# Patient Record
Sex: MALE | Race: WHITE | NOT HISPANIC OR LATINO | URBAN - METROPOLITAN AREA
[De-identification: names, ages, dates, MRNs, and addresses within clinical notes are randomized per-mention and may not be internally consistent; named-entity substitution may affect disease eponyms.]

---

## 2021-06-28 ENCOUNTER — TRANSCRIBE ORDERS (OUTPATIENT)
Dept: ADMINISTRATIVE | Facility: HOSPITAL | Age: 51
End: 2021-06-28

## 2021-06-28 DIAGNOSIS — R07.9 CHEST PAIN, UNSPECIFIED TYPE: Primary | ICD-10-CM

## 2021-07-06 ENCOUNTER — HOSPITAL ENCOUNTER (OUTPATIENT)
Dept: CARDIOLOGY | Facility: HOSPITAL | Age: 51
Discharge: HOME | End: 2021-07-06
Admitting: NURSE PRACTITIONER

## 2021-07-06 DIAGNOSIS — R07.9 CHEST PAIN, UNSPECIFIED TYPE: ICD-10-CM

## 2021-07-06 LAB
BH CV STRESS BP STAGE 1: NORMAL
BH CV STRESS BP STAGE 2: NORMAL
BH CV STRESS BP STAGE 3: NORMAL
BH CV STRESS BP STAGE 4: NORMAL
BH CV STRESS DURATION MIN STAGE 1: 3
BH CV STRESS DURATION MIN STAGE 2: 3
BH CV STRESS DURATION MIN STAGE 3: 3
BH CV STRESS DURATION MIN STAGE 4: 2
BH CV STRESS DURATION SEC STAGE 1: 0
BH CV STRESS DURATION SEC STAGE 2: 0
BH CV STRESS DURATION SEC STAGE 3: 0
BH CV STRESS DURATION SEC STAGE 4: 59
BH CV STRESS GRADE STAGE 1: 10
BH CV STRESS GRADE STAGE 2: 12
BH CV STRESS GRADE STAGE 3: 14
BH CV STRESS GRADE STAGE 4: 16
BH CV STRESS HR STAGE 1: 83
BH CV STRESS HR STAGE 2: 104
BH CV STRESS HR STAGE 3: 122
BH CV STRESS HR STAGE 4: 154
BH CV STRESS METS STAGE 1: 5
BH CV STRESS METS STAGE 2: 7.5
BH CV STRESS METS STAGE 3: 10
BH CV STRESS METS STAGE 4: 13.5
BH CV STRESS PROTOCOL 1: NORMAL
BH CV STRESS RECOVERY BP: NORMAL MMHG
BH CV STRESS RECOVERY HR: 96 BPM
BH CV STRESS SPEED STAGE 1: 1.7
BH CV STRESS SPEED STAGE 2: 2.5
BH CV STRESS SPEED STAGE 3: 3.4
BH CV STRESS SPEED STAGE 4: 4.2
BH CV STRESS STAGE 1: 1
BH CV STRESS STAGE 2: 2
BH CV STRESS STAGE 3: 3
BH CV STRESS STAGE 4: 4
MAXIMAL PREDICTED HEART RATE: 170 BPM
PERCENT MAX PREDICTED HR: 91.76 %
STRESS BASELINE BP: NORMAL MMHG
STRESS BASELINE HR: 71 BPM
STRESS O2 SAT REST: 98 %
STRESS PERCENT HR: 108 %
STRESS POST ESTIMATED WORKLOAD: 13.5 METS
STRESS POST EXERCISE DUR MIN: 11 MIN
STRESS POST EXERCISE DUR SEC: 59 SEC
STRESS POST O2 SAT PEAK: 98 %
STRESS POST PEAK BP: NORMAL MMHG
STRESS POST PEAK HR: 156 BPM
STRESS TARGET HR: 145 BPM

## 2021-07-06 PROCEDURE — 93016 CV STRESS TEST SUPVJ ONLY: CPT | Performed by: INTERNAL MEDICINE

## 2021-07-06 PROCEDURE — 93018 CV STRESS TEST I&R ONLY: CPT | Performed by: INTERNAL MEDICINE

## 2021-07-06 PROCEDURE — 93017 CV STRESS TEST TRACING ONLY: CPT

## 2021-11-22 ENCOUNTER — APPOINTMENT (OUTPATIENT)
Dept: MRI IMAGING | Facility: HOSPITAL | Age: 51
End: 2021-11-22

## 2021-11-22 ENCOUNTER — HOSPITAL ENCOUNTER (EMERGENCY)
Facility: HOSPITAL | Age: 51
Discharge: HOME | End: 2021-11-22
Attending: EMERGENCY MEDICINE | Admitting: EMERGENCY MEDICINE

## 2021-11-22 ENCOUNTER — APPOINTMENT (OUTPATIENT)
Dept: CT IMAGING | Facility: HOSPITAL | Age: 51
End: 2021-11-22

## 2021-11-22 ENCOUNTER — APPOINTMENT (OUTPATIENT)
Dept: GENERAL RADIOLOGY | Facility: HOSPITAL | Age: 51
End: 2021-11-22

## 2021-11-22 VITALS
RESPIRATION RATE: 16 BRPM | DIASTOLIC BLOOD PRESSURE: 94 MMHG | HEIGHT: 74 IN | OXYGEN SATURATION: 98 % | WEIGHT: 186 LBS | SYSTOLIC BLOOD PRESSURE: 136 MMHG | TEMPERATURE: 97.7 F | BODY MASS INDEX: 23.87 KG/M2 | HEART RATE: 91 BPM

## 2021-11-22 DIAGNOSIS — R42 VERTIGO: ICD-10-CM

## 2021-11-22 DIAGNOSIS — R91.1 LUNG NODULE: Primary | ICD-10-CM

## 2021-11-22 LAB
ABO GROUP BLD: NORMAL
ABO GROUP BLD: NORMAL
ALBUMIN SERPL-MCNC: 4.6 G/DL (ref 3.5–5.2)
ALBUMIN/GLOB SERPL: 1.6 G/DL
ALP SERPL-CCNC: 61 U/L (ref 39–117)
ALT SERPL W P-5'-P-CCNC: 41 U/L (ref 1–41)
ANION GAP SERPL CALCULATED.3IONS-SCNC: 7.9 MMOL/L (ref 5–15)
AST SERPL-CCNC: 28 U/L (ref 1–40)
BASOPHILS # BLD AUTO: 0.03 10*3/MM3 (ref 0–0.2)
BASOPHILS NFR BLD AUTO: 0.5 % (ref 0–1.5)
BILIRUB SERPL-MCNC: 0.6 MG/DL (ref 0–1.2)
BLD GP AB SCN SERPL QL: NEGATIVE
BUN SERPL-MCNC: 12 MG/DL (ref 6–20)
BUN/CREAT SERPL: 12.9 (ref 7–25)
CALCIUM SPEC-SCNC: 9.4 MG/DL (ref 8.6–10.5)
CHLORIDE SERPL-SCNC: 102 MMOL/L (ref 98–107)
CO2 SERPL-SCNC: 29.1 MMOL/L (ref 22–29)
CREAT SERPL-MCNC: 0.93 MG/DL (ref 0.76–1.27)
DEPRECATED RDW RBC AUTO: 41.6 FL (ref 37–54)
EOSINOPHIL # BLD AUTO: 0.06 10*3/MM3 (ref 0–0.4)
EOSINOPHIL NFR BLD AUTO: 0.9 % (ref 0.3–6.2)
ERYTHROCYTE [DISTWIDTH] IN BLOOD BY AUTOMATED COUNT: 12.5 % (ref 12.3–15.4)
GFR SERPL CREATININE-BSD FRML MDRD: 86 ML/MIN/1.73
GLOBULIN UR ELPH-MCNC: 2.9 GM/DL
GLUCOSE BLDC GLUCOMTR-MCNC: 153 MG/DL (ref 70–130)
GLUCOSE SERPL-MCNC: 149 MG/DL (ref 65–99)
HCT VFR BLD AUTO: 45.7 % (ref 37.5–51)
HGB BLD-MCNC: 15.2 G/DL (ref 13–17.7)
HOLD SPECIMEN: NORMAL
HOLD SPECIMEN: NORMAL
IMM GRANULOCYTES # BLD AUTO: 0.02 10*3/MM3 (ref 0–0.05)
IMM GRANULOCYTES NFR BLD AUTO: 0.3 % (ref 0–0.5)
INR PPP: 1.01 (ref 0.9–1.1)
LYMPHOCYTES # BLD AUTO: 1.69 10*3/MM3 (ref 0.7–3.1)
LYMPHOCYTES NFR BLD AUTO: 26.6 % (ref 19.6–45.3)
MCH RBC QN AUTO: 29.8 PG (ref 26.6–33)
MCHC RBC AUTO-ENTMCNC: 33.3 G/DL (ref 31.5–35.7)
MCV RBC AUTO: 89.6 FL (ref 79–97)
MONOCYTES # BLD AUTO: 0.58 10*3/MM3 (ref 0.1–0.9)
MONOCYTES NFR BLD AUTO: 9.1 % (ref 5–12)
NEUTROPHILS NFR BLD AUTO: 3.98 10*3/MM3 (ref 1.7–7)
NEUTROPHILS NFR BLD AUTO: 62.6 % (ref 42.7–76)
PLATELET # BLD AUTO: 348 10*3/MM3 (ref 140–450)
PMV BLD AUTO: 9.7 FL (ref 6–12)
POTASSIUM SERPL-SCNC: 4.3 MMOL/L (ref 3.5–5.2)
PROT SERPL-MCNC: 7.5 G/DL (ref 6–8.5)
PROTHROMBIN TIME: 13.3 SECONDS (ref 12.1–15)
QT INTERVAL: 370 MS
RBC # BLD AUTO: 5.1 10*6/MM3 (ref 4.14–5.8)
RH BLD: POSITIVE
RH BLD: POSITIVE
SODIUM SERPL-SCNC: 139 MMOL/L (ref 136–145)
T&S EXPIRATION DATE: NORMAL
TROPONIN T SERPL-MCNC: <0.01 NG/ML (ref 0–0.03)
WBC NRBC COR # BLD: 6.36 10*3/MM3 (ref 3.4–10.8)
WHOLE BLOOD HOLD SPECIMEN: NORMAL
WHOLE BLOOD HOLD SPECIMEN: NORMAL

## 2021-11-22 PROCEDURE — 85610 PROTHROMBIN TIME: CPT | Performed by: EMERGENCY MEDICINE

## 2021-11-22 PROCEDURE — 99284 EMERGENCY DEPT VISIT MOD MDM: CPT | Performed by: EMERGENCY MEDICINE

## 2021-11-22 PROCEDURE — 93010 ELECTROCARDIOGRAM REPORT: CPT | Performed by: INTERNAL MEDICINE

## 2021-11-22 PROCEDURE — 86850 RBC ANTIBODY SCREEN: CPT | Performed by: EMERGENCY MEDICINE

## 2021-11-22 PROCEDURE — 99284 EMERGENCY DEPT VISIT MOD MDM: CPT

## 2021-11-22 PROCEDURE — 93005 ELECTROCARDIOGRAM TRACING: CPT | Performed by: EMERGENCY MEDICINE

## 2021-11-22 PROCEDURE — 70450 CT HEAD/BRAIN W/O DYE: CPT

## 2021-11-22 PROCEDURE — 70551 MRI BRAIN STEM W/O DYE: CPT

## 2021-11-22 PROCEDURE — 86901 BLOOD TYPING SEROLOGIC RH(D): CPT

## 2021-11-22 PROCEDURE — 86900 BLOOD TYPING SEROLOGIC ABO: CPT | Performed by: EMERGENCY MEDICINE

## 2021-11-22 PROCEDURE — 85025 COMPLETE CBC W/AUTO DIFF WBC: CPT | Performed by: EMERGENCY MEDICINE

## 2021-11-22 PROCEDURE — 84484 ASSAY OF TROPONIN QUANT: CPT | Performed by: EMERGENCY MEDICINE

## 2021-11-22 PROCEDURE — 82962 GLUCOSE BLOOD TEST: CPT

## 2021-11-22 PROCEDURE — 80053 COMPREHEN METABOLIC PANEL: CPT | Performed by: EMERGENCY MEDICINE

## 2021-11-22 PROCEDURE — 86900 BLOOD TYPING SEROLOGIC ABO: CPT

## 2021-11-22 PROCEDURE — 86901 BLOOD TYPING SEROLOGIC RH(D): CPT | Performed by: EMERGENCY MEDICINE

## 2021-11-22 PROCEDURE — 71045 X-RAY EXAM CHEST 1 VIEW: CPT

## 2021-11-22 RX ORDER — BUSPIRONE HYDROCHLORIDE 10 MG/1
10 TABLET ORAL 3 TIMES DAILY
COMMUNITY

## 2021-11-22 RX ORDER — SODIUM CHLORIDE 0.9 % (FLUSH) 0.9 %
10 SYRINGE (ML) INJECTION AS NEEDED
Status: DISCONTINUED | OUTPATIENT
Start: 2021-11-22 | End: 2021-11-22 | Stop reason: HOSPADM

## 2021-11-22 RX ORDER — MECLIZINE HYDROCHLORIDE 25 MG/1
25 TABLET ORAL ONCE
Status: COMPLETED | OUTPATIENT
Start: 2021-11-22 | End: 2021-11-22

## 2021-11-22 RX ORDER — SCOLOPAMINE TRANSDERMAL SYSTEM 1 MG/1
1 PATCH, EXTENDED RELEASE TRANSDERMAL
Qty: 3 PATCH | Refills: 0 | Status: SHIPPED | OUTPATIENT
Start: 2021-11-22 | End: 2021-11-29

## 2021-11-22 RX ADMIN — MECLIZINE HYDROCHLORIDE 25 MG: 25 TABLET ORAL at 13:03

## 2021-11-22 NOTE — ED NOTES
Ambulated patient around ER. Patient had a hard time walking in a straight line said he felt off balance.     Eduard Turcios  11/22/21 0065

## 2021-11-22 NOTE — ED PROVIDER NOTES
Subjective   History of Present Illness  History of Present Illness    Chief complaint: Difficulty with balance and slurred speech    Location: Patient noticed the difficulty with balance on the job this morning difficulty thinking, slurred speech yesterday    Quality/Severity: Slurred speech difficulty balance and thinking.    Timing/Onset/Duration: Noticed at 5:30 AM yesterday upon awakening.    Modifying Factors: The balance seems better today but is still off.  The patient states that bending over makes it worse.    Associated Symptoms: No headache.  No fever chills or cough.  No sore throat earache or nasal congestion.  No chest pain or shortness of breath.  No palpitations.  No abdominal pain.  No nausea or vomiting.  No numbness, tingling, weakness, or change in bladder or bowel function.  No change in vision.  No difficulty swallowing.    Narrative: This 51-year-old white male presents with an episode of difficulty balancing himself at work, slurred speech, and difficulty thinking.  The patient is never anything like this before.  There is no history of any trauma patient does not have hypertension.  He does not have heart disease.  He is not diabetic.  No history of elevated cholesterol.  The patient occasionally has tinnitus.  He has no tinnitus currently.    PCP: Francoise Santana    Review of Systems   Constitutional: Negative for chills and fever.   HENT: Negative for congestion, ear pain, sore throat and trouble swallowing.    Eyes: Negative for visual disturbance.   Respiratory: Negative for cough and shortness of breath.    Cardiovascular: Negative for chest pain and palpitations.   Gastrointestinal: Negative for abdominal pain, diarrhea, nausea and vomiting.   Genitourinary: Negative for difficulty urinating.   Musculoskeletal: Negative for back pain.   Skin: Negative for rash.   Neurological: Positive for dizziness. Negative for tremors, seizures, syncope, facial asymmetry, speech difficulty, weakness,  light-headedness, numbness and headaches.   Psychiatric/Behavioral: Positive for decreased concentration.        Medication List      You have not been prescribed any medications.         No past medical history on file.    Not on File    No past surgical history on file.    No family history on file.    Social History     Socioeconomic History   • Marital status:            Objective   Physical Exam  Vitals (The temperature is 97.7 °F, pulse 90, respirations 16, /91, room air pulse ox 98%) and nursing note reviewed.   Constitutional:       Appearance: Normal appearance.   HENT:      Head: Normocephalic and atraumatic.      Right Ear: Tympanic membrane normal.      Left Ear: Tympanic membrane normal.      Nose: Nose normal.      Mouth/Throat:      Mouth: Mucous membranes are moist.   Eyes:      General: No scleral icterus.     Extraocular Movements: Extraocular movements intact.      Pupils: Pupils are equal, round, and reactive to light.   Cardiovascular:      Rate and Rhythm: Normal rate and regular rhythm.      Pulses: Normal pulses.      Heart sounds: Normal heart sounds. No murmur heard.  No friction rub. No gallop.       Comments: No carotid bruits  Pulmonary:      Effort: Pulmonary effort is normal.      Breath sounds: Normal breath sounds.   Abdominal:      General: Abdomen is flat. Bowel sounds are normal. There is no distension.      Palpations: There is no mass.      Tenderness: There is no abdominal tenderness. There is no guarding or rebound.      Hernia: No hernia is present.   Musculoskeletal:         General: No swelling, tenderness, deformity or signs of injury. Normal range of motion.      Cervical back: Normal range of motion and neck supple. No rigidity.   Skin:     General: Skin is warm and dry.      Findings: No rash.   Neurological:      General: No focal deficit present.      Mental Status: He is alert and oriented to person, place, and time.      Cranial Nerves: No cranial nerve  deficit.      Sensory: No sensory deficit.      Motor: No weakness.      Coordination: Coordination normal.   Psychiatric:         Mood and Affect: Mood normal.         Procedures           ED Course  ED Course as of 11/22/21 1107   Mon Nov 22, 2021   1105 The laboratory values were reviewed by me.  The serum glucose is 149.  The CO2 is 29.1.  The laboratory values are otherwise unremarkable. [RC]      ED Course User Index  [RC] Igor Bird MD      10:14 EST, 11/22/21:  I spoke with Dr. Mooney, on-call for stroke neurology, he recommended getting an MRI without contrast.     10:21 EST, 11/22/21:  The EKG was obtained at 1017 read by me at 1019.  EKG shows a normal sinus rhythm with rate of 70.  There is a normal axis with no hypertrophy.  The TX, QRS, and QT intervals are unremarkable.  There is no ectopy.  There is no acute ST elevation or depression.     12:57 EST, 11/22/21:  The patient's orthostatics are negative    13:34 EST, 11/22/21:  I spoke with , on-call for neurology.  He recommended placing the patient on a scopolamine patch.  The patient should be discharged with follow-up with primary care provider.    13:35 EST, 11/22/21:  The patient was reassessed.  He states that the meclizine really did not do much for the dizziness.  He does state that since last time I evaluated him, has been having intermittent tinnitus discussed with the patient that he has vertigo.  He should follow-up with his primary care provider within a week.  We will write the patient a prescription for scopolamine patch.  Patient should follow-up with Francoise Santana in 1 week.  He should return to the emergency department if there is worsening dizziness, numbness, tingling, weakness, slurred speech, difficulty swallowing or speaking, worse in any way at all.  All the patient's questions were answered he will be discharged in good condition.  It was discussed the patient that he should follow-up the lung nodule with Francoise  Max when he follows up for the vertigo.                           [facility]    Final diagnoses:   Lung nodule   Vertigo       ED Disposition  ED Disposition     None          No follow-up provider specified.       Medication List      No changes were made to your prescriptions during this visit.          Igor Bird MD  11/22/21 3384

## 2021-11-22 NOTE — DISCHARGE INSTRUCTIONS
Rest.  Drink plenty of fluids.  Wear the scopolamine patch as directed.  Follow-up with Francoise Santana within 1 week.  Turn to the emergency department if there is worsening dizziness, numbness, tingling, weakness, difficulty swallowing or speaking, change in vision, worse in any way at all.  Follow-up the lung nodule with Francoise Santana when you follow-up for the vertigo.

## 2021-11-22 NOTE — ED NOTES
Call placed to Neurology for on call physician, will page and call back.      Eduard Turcios  11/22/21 1171

## 2021-11-30 ENCOUNTER — TRANSCRIBE ORDERS (OUTPATIENT)
Dept: CT IMAGING | Facility: HOSPITAL | Age: 51
End: 2021-11-30

## 2021-11-30 DIAGNOSIS — R91.1 SOLITARY PULMONARY NODULE: Primary | ICD-10-CM

## 2021-12-09 ENCOUNTER — HOSPITAL ENCOUNTER (OUTPATIENT)
Dept: CT IMAGING | Facility: HOSPITAL | Age: 51
Discharge: HOME | End: 2021-12-09
Admitting: NURSE PRACTITIONER

## 2021-12-09 DIAGNOSIS — R91.1 SOLITARY PULMONARY NODULE: ICD-10-CM

## 2021-12-09 PROCEDURE — 71250 CT THORAX DX C-: CPT
